# Patient Record
Sex: FEMALE | Race: OTHER | Employment: FULL TIME
[De-identification: names, ages, dates, MRNs, and addresses within clinical notes are randomized per-mention and may not be internally consistent; named-entity substitution may affect disease eponyms.]

---
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
## 2020-11-20 ENCOUNTER — HOSPITAL ENCOUNTER (EMERGENCY)
Age: 20
Discharge: HOME | End: 2020-11-20
Attending: EMERGENCY MEDICINE
Payer: COMMERCIAL

## 2020-11-20 VITALS
HEIGHT: 67 IN | OXYGEN SATURATION: 99 % | TEMPERATURE: 97.9 F | RESPIRATION RATE: 14 BRPM | DIASTOLIC BLOOD PRESSURE: 75 MMHG | SYSTOLIC BLOOD PRESSURE: 120 MMHG | WEIGHT: 170 LBS | HEART RATE: 75 BPM | BODY MASS INDEX: 26.68 KG/M2

## 2020-11-20 PROCEDURE — 99283 EMERGENCY DEPT VISIT LOW MDM: CPT

## 2020-11-20 RX ORDER — CETIRIZINE HYDROCHLORIDE 10 MG/1
10 TABLET ORAL DAILY
COMMUNITY

## 2020-11-20 ASSESSMENT — PAIN DESCRIPTION - ORIENTATION: ORIENTATION: RIGHT

## 2020-11-20 ASSESSMENT — PAIN DESCRIPTION - LOCATION: LOCATION: JAW

## 2020-11-20 ASSESSMENT — PAIN SCALES - GENERAL: PAINLEVEL_OUTOF10: 6
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
## 2020-11-20 NOTE — ED NOTES
Pt cleared for discharge per MD. Pt discharge instructions explained, No Rx Given. Pt Verbalized understanding of all instructions and all patient questions answered to their satisfaction. Pt departs from ED in stable condition.         Gianni Mccarthy RN  11/20/20 2033
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
## 2022-03-23 ENCOUNTER — OFFICE VISIT (OUTPATIENT)
Dept: NEUROLOGY | Age: 22
End: 2022-03-23
Payer: COMMERCIAL

## 2022-03-23 VITALS
HEART RATE: 77 BPM | BODY MASS INDEX: 24.71 KG/M2 | DIASTOLIC BLOOD PRESSURE: 70 MMHG | SYSTOLIC BLOOD PRESSURE: 108 MMHG | WEIGHT: 157.4 LBS | HEIGHT: 67 IN

## 2022-03-23 DIAGNOSIS — G43.711 CHRONIC MIGRAINE WITHOUT AURA, WITH INTRACTABLE MIGRAINE, SO STATED, WITH STATUS MIGRAINOSUS: Primary | ICD-10-CM

## 2022-03-23 PROCEDURE — G8484 FLU IMMUNIZE NO ADMIN: HCPCS | Performed by: PSYCHIATRY & NEUROLOGY

## 2022-03-23 PROCEDURE — G8427 DOCREV CUR MEDS BY ELIG CLIN: HCPCS | Performed by: PSYCHIATRY & NEUROLOGY

## 2022-03-23 PROCEDURE — G8420 CALC BMI NORM PARAMETERS: HCPCS | Performed by: PSYCHIATRY & NEUROLOGY

## 2022-03-23 PROCEDURE — 1036F TOBACCO NON-USER: CPT | Performed by: PSYCHIATRY & NEUROLOGY

## 2022-03-23 PROCEDURE — 99204 OFFICE O/P NEW MOD 45 MIN: CPT | Performed by: PSYCHIATRY & NEUROLOGY

## 2022-03-23 RX ORDER — ERENUMAB-AOOE 140 MG/ML
140 INJECTION, SOLUTION SUBCUTANEOUS
Qty: 2 PEN | Refills: 3 | Status: SHIPPED | OUTPATIENT
Start: 2022-03-23 | End: 2022-05-22

## 2022-03-23 RX ORDER — ERENUMAB-AOOE 70 MG/ML
INJECTION SUBCUTANEOUS
COMMUNITY
Start: 2022-03-02

## 2022-03-23 RX ORDER — UBROGEPANT 50 MG/1
50 TABLET ORAL PRN
Qty: 30 TABLET | Refills: 1 | Status: SHIPPED | OUTPATIENT
Start: 2022-03-23 | End: 2022-04-22

## 2022-03-23 NOTE — PROGRESS NOTES
[address]  [facility], [address] [place] [address], [address] [place] [address]  Ph: [phone] or [phone]  FAX: [phone]    Chief Complaint: migraines     Dear Gelacio Ballard MD     I had the pleasure of seeing your patient today in neurology consultation for her symptoms. As you would recall Geovanna Lynch is a 24 y.o. female. Patient with migraines worsening since childhood. Patient reports having headaches since childhood. She reports she has a constant headache that varies in severity. She reports about 50% of days she has a severe migraine. She reports her headaches typically occur in a band around her head or in a question aleah distribution. She describes her pain as pressure and stabbing. She notes neck and ear pain associated with her migraines. Patient reports nausea and photophobia with headaches. She denies phonophobia. She reports going to the ER about once per month with severe pain. She admits her headaches are most severe around the time of her periods. Patient denies aura, weakness, numbness or tingling prior to having a headache. Patient reports she was started on Aimovig 70 mg/mL by her PCP on March 3rd which has not provided any relief and she experienced immediate constipation. Patient denies smoking, marijuana or other illicit drug use. She admits drinking alcohol occasionally. 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      Current prophylactic medication Dosage   Aimovig 70 mg/mL monthly               Previous prophylactic medications Reason for discontinuation   Maxalt ineffective   Topamax Weight loss   amitriptyline  ineffective                 Previous Abortive medications Reason for discontinuation   Tylenol Did not find relief    Ibuprofen    Naproxen    Excedrin Migraine    Fioricet Incomplete relief   imitrex No relief             Past Medical History:   Diagnosis Date    Asthma     Lazy eye of right side     only sees shadows    Platelet dense granule deficiency ([facility] [address] 02/10/2017    3.69 DG/PL    Seasonal allergies      Past Surgical History:   Procedure Laterality Date    FOOT SURGERY Right 2/25/2015    Right calcaneal-navicular coalition    FOOT SURGERY Left     resection of calcaneal navicular coalition    WISDOM TOOTH EXTRACTION      WRIST GANGLION EXCISION Left      Allergies   Allergen Reactions    Nsaids Other (See Comments)     Nose bleeds       No family history on file. Social History     Socioeconomic History    Marital status: Single     Spouse name: Not on file    Number of children: Not on file    Years of education: Not on file    Highest education level: Not on file   Occupational History    Not on file   Tobacco Use    Smoking status: Never Smoker    Smokeless tobacco: Never Used   Substance and Sexual Activity    Alcohol use: No    Drug use: No    Sexual activity: Not on file   Other Topics Concern    Not on file   Social History Narrative    Not on file     Social Determinants of Health     Financial Resource Strain:     Difficulty of Paying Living Expenses: Not on file   Food Insecurity:     Worried About Running Out of Food in the Last Year: Not on file    Kina of Food in the Last Year: Not on file   Transportation Needs:     Lack of Transportation (Medical): Not on file    Lack of Transportation (Non-Medical):  Not on file   Physical Activity:     Days of Exercise per Week: Not on file    Minutes of Exercise per Session: Not on file   Stress:     Feeling of Stress : Not on file   Social Connections:     Frequency of Communication with Friends and Family: Not on file    Frequency of Social Gatherings with Friends and Family: Not on file    Attends Roman [employer] Services: Not on file    Active Member of Clubs or Organizations: Not on file    Attends Club or Organization Meetings: Not on file    Marital Status: Not on file   Intimate Partner Violence:     Fear of Current or Ex-Partner: Not on file    Emotionally Abused: Not on file    Physically Abused: Not on file    Sexually Abused: Not on file   Housing Stability:     Unable to Pay for Housing in the Last Year: Not on file    Number of Places Lived in the Last Year: Not on file    Unstable Housing in the Last Year: Not on file      There were no vitals taken for this visit. HEENT [] Hearing Loss  [] Visual Disturbance  [] Tinnitus  [] Eye pain   Respiratory [] Shortness of Breath  [] Cough  [] Snoring   Cardiovascular [] Chest Pain  [] Palpitations  [] Lightheaded   GI [x] Constipation  [] Diarrhea  [] Swallowing change  [x] Nausea/vomiting    [] Urinary Frequency  [] Urinary Urgency   Musculoskeletal [] Neck pain  [] Back pain  [] Muscle pain  [] Restless legs   Dermatologic [] Skin changes   Neurologic [] Memory loss/confusion  [] Seizures  [] Trouble walking or imbalance  [] Dizziness  [] Sleep disturbance  [] Weakness  [] Numbness  [] Tremors  [] Speech Difficulty  [x] Headaches  [x] Light Sensitivity  [] Sound Sensitivity   Endocrinology []Excessive thirst  []Excessive hunger   Psychiatric [] Anxiety/Depression  [] Hallucination   Allergy/immunology []Hives/environmental allergies   Hematologic/lymph [] Abnormal bleeding  [] Abnormal bruising       General appearence: Normal. Well groomed.  In no acute distress    Head: Normocephalic, atraumatic  Eyes: Extraocular movements intact, eye lids normal  Lungs: Respirations unlabored, chest wall no deformity  ENT: Normal external ear canals, no sinus tenderness  Heart: Regular rate rhythm  Abdomen: No masses, tenderness  Extremities: No cyanosis or edema, 2+ pulses  Musculoskeletal: Normal range of motion in all joints  Skin: Intact, normal skin color    Neurological examination:    Mental status   Alert and oriented; intact memory with no confusion, speech or language problems; no hallucinations or delusions     Cranial nerves   II - visual fields intact to confrontation III, IV, VI - extra-ocular muscles full: no pupillary defect; no ELLIOTT, no nystagmus, no ptosis   V - normal facial sensation                                                               VII - normal facial symmetry                                                             VIII - intact hearing                                                                             IX, X - symmetrical palate                                                                  XI - symmetrical shoulder shrug                                                       XII - midline tongue without atrophy or fasciculation     Motor function  Normal muscle bulk and tone; normal power 5/5, including fine motor movements     Sensory function Intact to touch, pin, vibration, proprioception     Cerebellar Intact fine motor movement. No involuntary movements or tremors     Reflex function Intact 2+ DTR and symmetric. Negative Babinski     Gait                  Normal station and gait       No results found for: LDLCALC, LDLCHOLESTEROL, LDLDIRECT  No components found for: CHLPL  No results found for: TRIG  No results found for: HDL  No results found for: LDLCALC  No results found for: LABVLDL  No results found for: LABA1C  No results found for: EAG  No results found for: [id number]   Neurological work up:  CT head  CTA head and neck  MRI brain   2 D echo     All of patient's labs were personally reviewed. All the imaging studies were personally reviewed and discussed with the patient. Assessment Recommendations:  chronic migraine without aura, intractable, with status migrainosus     Patient was started on Aimovig 70 mg/mL earlier this month which has not provided relief and has caused constipation. I discussed with her that we may either increase AImovig to 140 mg/ML and add magnesium supplements or trial Botox injections.      The patient has failed at least 2 prophylactic medications, she has headaches lasting more than 4 hours a day and has more than 15 headache days in a month. In my opinion, the patient would be a good candidate for Botox injections for her migraines. The patient meets the diagnosis for chronic migraine without aura, intractable, with status migrainosus (G43.711)    The patient would like to trial Botox injections however will require prior authorization. For now, we will increase Aimovig to 140 mg/mL monthly and initiate Ubrelvy 50 mg daily as an abortive medication. All medication side effects were discussed and questions answered. Patient to follow up in 1-2 for Botox or sooner if symptoms worsen. Jorge Rodriguez MD I would like to thank you for the consult. Please do not hesitate if you have any questions about the patient care. Rex Rodgers MD  Neurology     Scribe attestation:    Kodak Aponte am scribing for, and in the presence of, Dr Rex Rodgers. Electronically signed by: Juan Martines 3/23/22 1:15    This note is created with the assistance of a speech-recognition program. While intending to generate a document that actually reflects the content of the visit, the document can still have some errors including those of syntax and sound a- like substitutions which may escape proofreading. In such instances, actual meaning can be extrapolated by contextual derivation.

## 2022-03-30 ENCOUNTER — TELEPHONE (OUTPATIENT)
Dept: NEUROLOGY | Age: 22
End: 2022-03-30

## 2022-03-30 NOTE — TELEPHONE ENCOUNTER
Received a fax from [facility] requesting a prior auth for [facility]. 3/23/22 office note has not been completed. Will place fax request back in file folder.

## 2022-03-30 NOTE — TELEPHONE ENCOUNTER
Received a fax from [facility] requesting a prior Mark Mater for Ubrelvy. 3/23/22 office note has not been completed. Will place fax request back in file folder.

## 2022-04-08 ENCOUNTER — TELEPHONE (OUTPATIENT)
Dept: NEUROLOGY | Age: 22
End: 2022-04-08

## 2022-04-08 NOTE — TELEPHONE ENCOUNTER
Spoke with Maria Fernanda Rust, she was checking to see if any of the PA's for the Aimovig or Krystyna Ramming have been approved. A PA for each medication must be done for each insurance. One for [employer] and the other is [place] [employer]. Also has a PA been started for the Botox. Please advise.

## 2022-05-18 ENCOUNTER — TELEPHONE (OUTPATIENT)
Dept: NEUROLOGY | Age: 22
End: 2022-05-18

## 2023-03-29 ENCOUNTER — TELEPHONE (OUTPATIENT)
Dept: NEUROLOGY | Age: 23
End: 2023-03-29

## 2023-03-29 NOTE — TELEPHONE ENCOUNTER
Received PA request from pharmacy for Ubrelvy. Patient only seen one time in March 2022 as a new patient. Lm for patient to call into office to schedule follow up.

## 2023-03-30 NOTE — TELEPHONE ENCOUNTER
Darin Daigle called in and stated that she is now seeing Dr. Светлана Mojica as her new neurologist. They are taking care of her medications and a PA not required from our office.